# Patient Record
Sex: FEMALE | Race: WHITE | NOT HISPANIC OR LATINO | URBAN - NONMETROPOLITAN AREA
[De-identification: names, ages, dates, MRNs, and addresses within clinical notes are randomized per-mention and may not be internally consistent; named-entity substitution may affect disease eponyms.]

---
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
## 2021-03-25 ENCOUNTER — IMPORTED ENCOUNTER (OUTPATIENT)
Dept: URBAN - NONMETROPOLITAN AREA CLINIC 1 | Facility: CLINIC | Age: 21
End: 2021-03-25

## 2021-03-25 PROCEDURE — 92015 DETERMINE REFRACTIVE STATE: CPT

## 2021-03-25 PROCEDURE — 92310 CONTACT LENS FITTING OU: CPT

## 2021-03-25 PROCEDURE — 92014 COMPRE OPH EXAM EST PT 1/>: CPT

## 2021-03-26 ENCOUNTER — IMPORTED ENCOUNTER (OUTPATIENT)
Dept: URBAN - NONMETROPOLITAN AREA CLINIC 1 | Facility: CLINIC | Age: 21
End: 2021-03-26

## 2021-03-26 PROBLEM — H53.19: Noted: 2021-03-26

## 2021-03-26 PROCEDURE — 92012 INTRM OPH EXAM EST PATIENT: CPT

## 2021-03-26 PROCEDURE — 92083 EXTENDED VISUAL FIELD XM: CPT

## 2021-03-26 NOTE — PATIENT DISCUSSION
OTHER SUB VISUAL DISTUBANCES OSEDUCATE PTVF TODAY FULL AND STABLECSR OS MILD POSSIBLEUSE OLD CL RX AFTER NEXT VISIT

## 2021-04-01 ENCOUNTER — IMPORTED ENCOUNTER (OUTPATIENT)
Dept: URBAN - NONMETROPOLITAN AREA CLINIC 1 | Facility: CLINIC | Age: 21
End: 2021-04-01

## 2021-04-01 PROBLEM — H53.19: Noted: 2021-04-01

## 2021-04-01 PROBLEM — H35.712: Noted: 2021-04-01

## 2021-04-01 PROCEDURE — 92134 CPTRZ OPH DX IMG PST SGM RTA: CPT

## 2021-04-01 PROCEDURE — 92012 INTRM OPH EXAM EST PATIENT: CPT

## 2021-04-01 NOTE — PATIENT DISCUSSION
OTHER SUB VISUAL DISTUBANCES OSEDUCATE PTCSR OS stable todayoct todaystable and monitorUSE OLD CL RX AFTER NEXT VISIT

## 2021-05-10 ENCOUNTER — IMPORTED ENCOUNTER (OUTPATIENT)
Dept: URBAN - NONMETROPOLITAN AREA CLINIC 1 | Facility: CLINIC | Age: 21
End: 2021-05-10

## 2021-05-10 NOTE — PATIENT DISCUSSION
cl check todaypt wanted astigmatism correction added to os rx even though she stated it made her vision worse on refraction todayrx given with toric correction os per pt request

## 2022-04-09 ASSESSMENT — TONOMETRY
OD_IOP_MMHG: 16
OD_IOP_MMHG: 17
OS_IOP_MMHG: 17
OS_IOP_MMHG: 16

## 2022-04-09 ASSESSMENT — VISUAL ACUITY
OD_SC: 20/40
OD_SC: J1
OD_SC: 20/40
OS_SC: 20/20
OS_SC: J1
OU_SC: 20/20
OS_SC: 20/20
OD_CC: J1
OD_SC: 20/40
OD_SC: 20/20
OU_SC: 20/20
OU_SC: 20/20
OS_CC: J1
OD_SC: 20/20
OS_SC: 20/20